# Patient Record
Sex: MALE | Race: WHITE | NOT HISPANIC OR LATINO | Employment: FULL TIME | URBAN - METROPOLITAN AREA
[De-identification: names, ages, dates, MRNs, and addresses within clinical notes are randomized per-mention and may not be internally consistent; named-entity substitution may affect disease eponyms.]

---

## 2021-07-24 ENCOUNTER — APPOINTMENT (OUTPATIENT)
Dept: GENERAL RADIOLOGY | Facility: CLINIC | Age: 36
End: 2021-07-24
Attending: EMERGENCY MEDICINE

## 2021-07-24 ENCOUNTER — HOSPITAL ENCOUNTER (EMERGENCY)
Facility: CLINIC | Age: 36
Discharge: HOME | End: 2021-07-24
Attending: EMERGENCY MEDICINE | Admitting: EMERGENCY MEDICINE

## 2021-07-24 VITALS
RESPIRATION RATE: 18 BRPM | HEART RATE: 83 BPM | OXYGEN SATURATION: 99 % | SYSTOLIC BLOOD PRESSURE: 136 MMHG | DIASTOLIC BLOOD PRESSURE: 67 MMHG | WEIGHT: 171 LBS | TEMPERATURE: 98 F

## 2021-07-24 DIAGNOSIS — S62.365A CLOSED NONDISPLACED FRACTURE OF NECK OF FOURTH METACARPAL BONE OF LEFT HAND, INITIAL ENCOUNTER: ICD-10-CM

## 2021-07-24 PROCEDURE — 99284 EMERGENCY DEPT VISIT MOD MDM: CPT | Mod: 25

## 2021-07-24 PROCEDURE — 73110 X-RAY EXAM OF WRIST: CPT | Mod: LT

## 2021-07-24 PROCEDURE — 73130 X-RAY EXAM OF HAND: CPT | Mod: LT

## 2021-07-24 PROCEDURE — 250N000013 HC RX MED GY IP 250 OP 250 PS 637: Performed by: EMERGENCY MEDICINE

## 2021-07-24 PROCEDURE — 26600 TREAT METACARPAL FRACTURE: CPT | Mod: LT

## 2021-07-24 RX ORDER — ACETAMINOPHEN 325 MG/1
975 TABLET ORAL ONCE
Status: DISCONTINUED | OUTPATIENT
Start: 2021-07-24 | End: 2021-07-24 | Stop reason: HOSPADM

## 2021-07-24 RX ORDER — OXYCODONE HYDROCHLORIDE 5 MG/1
5 TABLET ORAL EVERY 6 HOURS PRN
Qty: 12 TABLET | Refills: 0 | Status: SHIPPED | OUTPATIENT
Start: 2021-07-24

## 2021-07-24 RX ORDER — OXYCODONE HYDROCHLORIDE 5 MG/1
5 TABLET ORAL ONCE
Status: DISCONTINUED | OUTPATIENT
Start: 2021-07-24 | End: 2021-07-24 | Stop reason: HOSPADM

## 2021-07-24 RX ADMIN — ACETAMINOPHEN 975 MG: 325 TABLET, FILM COATED ORAL at 17:30

## 2021-07-24 RX ADMIN — OXYCODONE HYDROCHLORIDE 5 MG: 5 TABLET ORAL at 17:30
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
## 2021-07-24 NOTE — DISCHARGE INSTRUCTIONS
I recommend Tylenol and ibuprofen as needed for pain, you can ice your knuckle through your splint but your splint cannot get wet this includes in the shower.  You can take the oxycodone as needed for more severe pain.  You should elevate your hand to as this can help with swelling.  You should make an appointment to follow-up with orthopedic hand surgeon, you can call the number above and be seen by any of their partners there.  Should you have severe pain, the fingers on your hand turn ghost white or you have severe numbness you should return to the emergency department this can be a sign that your splint is too tight.

## 2021-07-24 NOTE — LETTER
July 24, 2021      To Whom It May Concern:      Phil Barros was seen in our Emergency Department today, 07/24/21.  It is okay for Phil to work, however he needs to wear his splint at all times and should not be put in a position that can compromise the integrity of his splint.  He is due to follow-up with orthopedic hand surgery sometime next week complete.    Sincerely,    _______________    Sebastian Meyers MD

## 2025-01-22 ENCOUNTER — HOSPITAL ENCOUNTER (EMERGENCY)
Facility: HOSPITAL | Age: 40
Discharge: HOME | End: 2025-01-22
Attending: EMERGENCY MEDICINE | Admitting: EMERGENCY MEDICINE

## 2025-01-22 ENCOUNTER — OFFICE VISIT (OUTPATIENT)
Dept: URGENT CARE | Facility: URGENT CARE | Age: 40
End: 2025-01-22

## 2025-01-22 VITALS
DIASTOLIC BLOOD PRESSURE: 99 MMHG | TEMPERATURE: 99.1 F | HEART RATE: 72 BPM | WEIGHT: 270.9 LBS | RESPIRATION RATE: 18 BRPM | OXYGEN SATURATION: 97 % | SYSTOLIC BLOOD PRESSURE: 116 MMHG

## 2025-01-22 VITALS
WEIGHT: 274.2 LBS | OXYGEN SATURATION: 99 % | HEIGHT: 75 IN | TEMPERATURE: 98.5 F | BODY MASS INDEX: 34.09 KG/M2 | RESPIRATION RATE: 25 BRPM | DIASTOLIC BLOOD PRESSURE: 77 MMHG | HEART RATE: 66 BPM | SYSTOLIC BLOOD PRESSURE: 131 MMHG

## 2025-01-22 DIAGNOSIS — R07.9 CHEST PAIN, UNSPECIFIED TYPE: Primary | ICD-10-CM

## 2025-01-22 DIAGNOSIS — R07.9 CHEST PAIN, UNSPECIFIED TYPE: ICD-10-CM

## 2025-01-22 LAB
ALBUMIN SERPL BCG-MCNC: 4.1 G/DL (ref 3.5–5.2)
ALP SERPL-CCNC: 108 U/L (ref 40–150)
ALT SERPL W P-5'-P-CCNC: 27 U/L (ref 0–70)
ANION GAP SERPL CALCULATED.3IONS-SCNC: 10 MMOL/L (ref 7–15)
AST SERPL W P-5'-P-CCNC: 27 U/L (ref 0–45)
BASOPHILS # BLD AUTO: 0.1 10E3/UL (ref 0–0.2)
BASOPHILS NFR BLD AUTO: 1 %
BILIRUB SERPL-MCNC: 0.4 MG/DL
BUN SERPL-MCNC: 6.6 MG/DL (ref 6–20)
CALCIUM SERPL-MCNC: 9.1 MG/DL (ref 8.8–10.4)
CHLORIDE SERPL-SCNC: 103 MMOL/L (ref 98–107)
CREAT SERPL-MCNC: 0.97 MG/DL (ref 0.67–1.17)
D DIMER PPP FEU-MCNC: <0.27 UG/ML FEU (ref 0–0.5)
EGFRCR SERPLBLD CKD-EPI 2021: >90 ML/MIN/1.73M2
EOSINOPHIL # BLD AUTO: 0.2 10E3/UL (ref 0–0.7)
EOSINOPHIL NFR BLD AUTO: 2 %
ERYTHROCYTE [DISTWIDTH] IN BLOOD BY AUTOMATED COUNT: 12.9 % (ref 10–15)
GLUCOSE SERPL-MCNC: 96 MG/DL (ref 70–99)
HCO3 SERPL-SCNC: 27 MMOL/L (ref 22–29)
HCT VFR BLD AUTO: 42.2 % (ref 40–53)
HGB BLD-MCNC: 14.1 G/DL (ref 13.3–17.7)
IMM GRANULOCYTES # BLD: 0 10E3/UL
IMM GRANULOCYTES NFR BLD: 0 %
LYMPHOCYTES # BLD AUTO: 2.7 10E3/UL (ref 0.8–5.3)
LYMPHOCYTES NFR BLD AUTO: 35 %
MCH RBC QN AUTO: 31 PG (ref 26.5–33)
MCHC RBC AUTO-ENTMCNC: 33.4 G/DL (ref 31.5–36.5)
MCV RBC AUTO: 93 FL (ref 78–100)
MONOCYTES # BLD AUTO: 0.6 10E3/UL (ref 0–1.3)
MONOCYTES NFR BLD AUTO: 8 %
NEUTROPHILS # BLD AUTO: 4.2 10E3/UL (ref 1.6–8.3)
NEUTROPHILS NFR BLD AUTO: 55 %
NRBC # BLD AUTO: 0 10E3/UL
NRBC BLD AUTO-RTO: 0 /100
NT-PROBNP SERPL-MCNC: 88 PG/ML (ref 0–450)
PLATELET # BLD AUTO: 242 10E3/UL (ref 150–450)
POTASSIUM SERPL-SCNC: 3.9 MMOL/L (ref 3.4–5.3)
PROT SERPL-MCNC: 7.5 G/DL (ref 6.4–8.3)
RBC # BLD AUTO: 4.55 10E6/UL (ref 4.4–5.9)
SODIUM SERPL-SCNC: 140 MMOL/L (ref 135–145)
TROPONIN T SERPL HS-MCNC: <6 NG/L
WBC # BLD AUTO: 7.6 10E3/UL (ref 4–11)

## 2025-01-22 PROCEDURE — 83880 ASSAY OF NATRIURETIC PEPTIDE: CPT | Performed by: EMERGENCY MEDICINE

## 2025-01-22 PROCEDURE — 99204 OFFICE O/P NEW MOD 45 MIN: CPT | Performed by: PHYSICIAN ASSISTANT

## 2025-01-22 PROCEDURE — 85004 AUTOMATED DIFF WBC COUNT: CPT | Performed by: EMERGENCY MEDICINE

## 2025-01-22 PROCEDURE — 84484 ASSAY OF TROPONIN QUANT: CPT | Performed by: EMERGENCY MEDICINE

## 2025-01-22 PROCEDURE — 36415 COLL VENOUS BLD VENIPUNCTURE: CPT | Performed by: EMERGENCY MEDICINE

## 2025-01-22 PROCEDURE — 93005 ELECTROCARDIOGRAM TRACING: CPT | Performed by: PHYSICIAN ASSISTANT

## 2025-01-22 PROCEDURE — 85379 FIBRIN DEGRADATION QUANT: CPT | Performed by: EMERGENCY MEDICINE

## 2025-01-22 PROCEDURE — 99285 EMERGENCY DEPT VISIT HI MDM: CPT | Mod: 25

## 2025-01-22 PROCEDURE — 93005 ELECTROCARDIOGRAM TRACING: CPT | Performed by: EMERGENCY MEDICINE

## 2025-01-22 PROCEDURE — 93005 ELECTROCARDIOGRAM TRACING: CPT | Performed by: STUDENT IN AN ORGANIZED HEALTH CARE EDUCATION/TRAINING PROGRAM

## 2025-01-22 PROCEDURE — 85048 AUTOMATED LEUKOCYTE COUNT: CPT | Performed by: EMERGENCY MEDICINE

## 2025-01-22 PROCEDURE — 82040 ASSAY OF SERUM ALBUMIN: CPT | Performed by: EMERGENCY MEDICINE

## 2025-01-22 ASSESSMENT — COLUMBIA-SUICIDE SEVERITY RATING SCALE - C-SSRS
6. HAVE YOU EVER DONE ANYTHING, STARTED TO DO ANYTHING, OR PREPARED TO DO ANYTHING TO END YOUR LIFE?: NO
1. IN THE PAST MONTH, HAVE YOU WISHED YOU WERE DEAD OR WISHED YOU COULD GO TO SLEEP AND NOT WAKE UP?: NO
2. HAVE YOU ACTUALLY HAD ANY THOUGHTS OF KILLING YOURSELF IN THE PAST MONTH?: NO

## 2025-01-22 ASSESSMENT — ACTIVITIES OF DAILY LIVING (ADL)
ADLS_ACUITY_SCORE: 41
ADLS_ACUITY_SCORE: 41

## 2025-01-23 LAB
ATRIAL RATE - MUSE: 69 BPM
ATRIAL RATE - MUSE: 72 BPM
DIASTOLIC BLOOD PRESSURE - MUSE: NORMAL MMHG
DIASTOLIC BLOOD PRESSURE - MUSE: NORMAL MMHG
INTERPRETATION ECG - MUSE: NORMAL
INTERPRETATION ECG - MUSE: NORMAL
P AXIS - MUSE: 62 DEGREES
P AXIS - MUSE: 69 DEGREES
PR INTERVAL - MUSE: 124 MS
PR INTERVAL - MUSE: 138 MS
QRS DURATION - MUSE: 100 MS
QRS DURATION - MUSE: 102 MS
QT - MUSE: 384 MS
QT - MUSE: 394 MS
QTC - MUSE: 420 MS
QTC - MUSE: 422 MS
R AXIS - MUSE: 63 DEGREES
R AXIS - MUSE: 78 DEGREES
SYSTOLIC BLOOD PRESSURE - MUSE: NORMAL MMHG
SYSTOLIC BLOOD PRESSURE - MUSE: NORMAL MMHG
T AXIS - MUSE: 56 DEGREES
T AXIS - MUSE: 57 DEGREES
VENTRICULAR RATE- MUSE: 69 BPM
VENTRICULAR RATE- MUSE: 72 BPM

## 2025-01-23 NOTE — DISCHARGE INSTRUCTIONS
Again your testing was reassuring here today.  No signs of heart attack, blood clots on the lungs, or infection  Most likely musculoskeletal cause  Ice or heat to area of discomfort, I would recommend ibuprofen 600 mg every 6 hours  Follow-up closely with primary care if ongoing symptoms.  Referral placed today  Return if any acute worsening of symptoms

## 2025-01-23 NOTE — ED PROVIDER NOTES
EMERGENCY DEPARTMENT ENCOUNTER      NAME: Phil Barros  AGE: 39 year old male  YOB: 1985  MRN: [id number]  EVALUATION DATE & TIME: No admission date for patient encounter.    PCP: System, Provider Not In    ED PROVIDER: Bridget Burt DO      Chief Complaint   Patient presents with    Chest Pain         FINAL IMPRESSION:  1. Chest pain, unspecified type          ED COURSE & MEDICAL DECISION MAKING:    Pertinent Labs & Imaging studies reviewed. (See chart for details)  9:26 PM I met the patient and performed my initial interview and exam.    39 year old male presents to the Emergency Department for evaluation of chest pain.    MEDICAL DECISION MAKING: I was concerned about this patient's chest pain and considered multiple causes including but not limited to the following.     ACS: EKG does not show acute ischemic changes and cardiac enzymes are negative. Heart Score is low risk.  PE:  Wells Criteria is low risk.  D-dimer negative  Aortic Dissection: The onset of pain was neither sudden nor severe, no history of poorly controlled high blood pressure and radial / pedal pulses are symmetrical, There is no widening of the mediastinum on chest x-ray, and no marfanoid features are present.  Cardiac Tamponade:  EKG shows no decreased voltage, heart sounds are not diminished on exam.    PTX: No sign of pneumothorax on chest x-ray.   Esophageal Rupture: There was not preceding forceful vomiting or retching and the CXR does not show mediastinal free air.   Other causes considered:  pericarditis, pleural effusion, pneumonia and referred pain from the ABD      IMPRESSION: Based on this patient's history, exam, and diagnostic results, the working diagnosis of this patient's chest pain is likely musculoskeletal.       DISPOSITION PLAN:    Discharge. Patient is appropriate for outpatient management with medications per discharge instructions.  Patient was given verbal and written discharge instructions for follow  up as well as indications for return to the Emergency Department.     At the conclusion of the encounter I discussed the results of all of the tests and the disposition. The questions were answered. The patient or family acknowledged understanding and was agreeable with the care plan.     Medical Decision Making  Obtained supplemental history:Supplemental history obtained?: No  Reviewed external records: External records reviewed?: Documented in chart and Outpatient Record: Visit on 8/6/2018 at [facility] Emergency Department for evaluation of leg pain/problem.  Care impacted by chronic illness:Documented in Chart  Did you consider but not order tests?: Work up considered but not performed and documented in chart, if applicable  Did you interpret images independently?: Independent interpretation of ECG and images noted in documentation, when applicable.  Consultation discussion with other provider:Did you involve another provider (consultant, , pharmacy, etc.)?: No  Discharge. No recommendations on prescription strength medication(s). See documentation for any additional details.    MIPS: Not Applicable        MEDICATIONS GIVEN IN THE EMERGENCY:  Medications - No data to display    NEW PRESCRIPTIONS STARTED AT TODAY'S ER VISIT  There are no discharge medications for this patient.         =================================================================    HPI    Patient information was obtained from: Patient    Use of : N/A         Phil Barros is a 39 year old male with a pertinent history of DVT who presents to this ED for evaluation of chest pain.  Symptoms around 4 days ago.  Thought it was related to eating pizza the night before.  Pain with movements.  Unable to get comfortable while laying flat.  No shortness of breath.  No cough or fever.  No leg swelling.  He has not tried anything for symptoms.  Reports in 2018 he was diagnosed with a DVT in his left leg.  He took about a couple  "months of anticoagulation but then stopped and never followed up.  Reports dad with a history of blood clots but unsure the cause.  He went to urgent care and given this history was recommended to seek evaluation in the emergency department.    Patient seen at outside hospital for leg pain on 8/6/2018.  Deep compartment veins within the gastrocnemius muscles of the calf are noncompressible and likely thrombosed.  Had been prescribed Xarelto.      REVIEW OF SYSTEMS   Per HPI    PAST MEDICAL HISTORY:  No past medical history on file.    PAST SURGICAL HISTORY:  No past surgical history on file.        CURRENT MEDICATIONS:    No current outpatient medications on file.       ALLERGIES:  No Known Allergies    FAMILY HISTORY:  No family history on file.    SOCIAL HISTORY:   Social History     Socioeconomic History    Marital status:        VITALS:  /77   Pulse 66   Temp 98.5  F (36.9  C) (Oral)   Resp 25   Ht 1.905 m (6' 3\")   Wt 124.4 kg (274 lb 3.2 oz)   SpO2 99%   BMI 34.27 kg/m      PHYSICAL EXAM    Physical Exam  Constitutional:       General: He is not in acute distress.  HENT:      Head: Normocephalic and atraumatic.      Mouth/Throat:      Pharynx: Oropharynx is clear.   Eyes:      Pupils: Pupils are equal, round, and reactive to light.   Cardiovascular:      Rate and Rhythm: Normal rate and regular rhythm.      Pulses: Normal pulses.      Heart sounds: Normal heart sounds.   Pulmonary:      Effort: Pulmonary effort is normal.      Breath sounds: Normal breath sounds.   Chest:      Chest wall: Tenderness present. No deformity or crepitus.   Abdominal:      General: Abdomen is flat. Bowel sounds are normal.      Palpations: Abdomen is soft.      Tenderness: There is no abdominal tenderness.   Musculoskeletal:         General: Normal range of motion.   Skin:     General: Skin is warm and dry.      Capillary Refill: Capillary refill takes less than 2 seconds.   Neurological:      General: No focal " deficit present.      Mental Status: He is alert and oriented to person, place, and time.             LAB:  All pertinent labs reviewed and interpreted.  Labs Ordered and Resulted from Time of ED Arrival to Time of ED Departure   COMPREHENSIVE METABOLIC PANEL - Normal       Result Value    Sodium 140      Potassium 3.9      Carbon Dioxide (CO2) 27      Anion Gap 10      Urea Nitrogen 6.6      Creatinine 0.97      GFR Estimate >90      Calcium 9.1      Chloride 103      Glucose 96      Alkaline Phosphatase 108      AST 27      ALT 27      Protein Total 7.5      Albumin 4.1      Bilirubin Total 0.4     TROPONIN T, HIGH SENSITIVITY - Normal    Troponin T, High Sensitivity <6     NT PROBNP INPATIENT - Normal    N terminal Pro BNP Inpatient 88     D DIMER QUANTITATIVE - Normal    D-Dimer Quantitative <0.27     CBC WITH PLATELETS AND DIFFERENTIAL    WBC Count 7.6      RBC Count 4.55      Hemoglobin 14.1      Hematocrit 42.2      MCV 93      MCH 31.0      MCHC 33.4      RDW 12.9      Platelet Count 242      % Neutrophils 55      % Lymphocytes 35      % Monocytes 8      % Eosinophils 2      % Basophils 1      % Immature Granulocytes 0      NRBCs per 100 WBC 0      Absolute Neutrophils 4.2      Absolute Lymphocytes 2.7      Absolute Monocytes 0.6      Absolute Eosinophils 0.2      Absolute Basophils 0.1      Absolute Immature Granulocytes 0.0      Absolute NRBCs 0.0         RADIOLOGY:  Reviewed all pertinent imaging. Please see official radiology report.  Chest XR,  PA & LAT   Final Result   IMPRESSION: Normal chest radiograph.          EKG:    Performed at: 20:13:44    Impression: Sinus rhythm.     Rate: 72 bpm  Rhythm: Sinus  Axis: 63  WA Interval: 138 ms  QRS Interval: 100 ms  QTc Interval: 420 ms  ST Changes: N/A  Comparison: No previous EKG(s) available.     I have independently reviewed and interpreted the EKG(s) documented above.      Bridget Burt,   Emergency Medicine  [facility]  EMERGENCY DEPARTMENT  [address] [place] [id number]  [phone]  Dept: [phone]       Carmel, Bridget RICH DO  01/22/25 2316

## 2025-01-23 NOTE — ED NOTES
Expected Patient Referral to ED  7:43 PM    Referring Clinic/Provider:  Urgent care    Reason for referral/Clinical facts:  Left sided chest pain, prior history of PE    Recommendations provided:  Send to ED for further evaluation    Caller was informed that this institution does possess the capabilities and/or resources to provide for patient and should be transferred to our facility.    Discussed that if direct admit is sought and any hurdles are encountered, this ED would be happy to see the patient and evaluate.    Informed caller that recommendations provided are recommendations based only on the facts provided and that they responsible to accept or reject the advice, or to seek a formal in person consultation as needed and that this ED will see/treat patient should they arrive.      Bridget Burt DO  [facility] EMERGENCY DEPARTMENT  [address] [place] [id number] [phone]       Bridget Burt DO  01/22/25 1944

## 2025-01-23 NOTE — ED TRIAGE NOTES
Patient referred here from [facility]. Complains of 3 days of left sided chest pain. Sharp pain, does not radiate. Pain occurs with movement. Denies shortness of breathe.   History of DVT in 2017.

## 2025-01-23 NOTE — PROGRESS NOTES
"Assessment & Plan     Chest pain  Pt was seen for L anterior chest pain x 4 days, worse with supine position, better upright, non-exertional, some increase with deep breathing.  No associated SOB. Ddx includes but not limited to : costochondritis, pleurisy, myocarditis, pericarditis, ACS, PE.   Clinically there is some reproducibility suggesting musculoskeletal cause and had recent viral uri, however given acute onset, hx of PE would recommend patient be seen in the ED to rule out life threatening causes as above.  Pt agreeable with plan.  Discussed with EDMD at [facility].    - EKG 12-lead, tracing only             Azra Castillo PA-C  [facility] EVA Villarreal is a 39 year old male who presents to clinic today for the following health issues:  Chief Complaint   Patient presents with    Chest Pain     Sharp pain on the L side of chest- - when laying pressure on the heart , sharp pain for the past 4-5 days- no cough or cold       HPI  Pt is a 39 year old male, with hx of PE in 2018, presents to the clinic with concern re: L chest pain.    L side pain. Started 3-4 days ago. Woke with pain, no trauma to the chest or activity that could have prompted.    Light \"head cold\" about 3 weeks ago. Resolved without problem.  No sob, wheezing during that time.  Did not do covid 19 test.    Constant pain: woke with it 4 days ago. somewhat worse with deep breathing.  Increased with cough.  Laying flat increased pain.  Can keep up at night.    Slept upright last night because it hurt too bad.    No sob at rest.    No current cough.    No medication tried.    No trauma.    Had a DVT in 2018 treated with xarelto.     Unprovoked other than slept sitting up.  Sed xaralto for 2 months and has not had any recurrence.    Family history of blood clot in father: not certain of specifics.        Review of Systems  Constitutional, HEENT, cardiovascular, pulmonary, gi and gu systems are negative, " except as otherwise noted.      There is no problem list on file for this patient.    No current outpatient medications on file.     No current facility-administered medications for this visit.         Objective    BP (!) 116/99 (BP Location: Left arm, Patient Position: Sitting, Cuff Size: Adult Large)   Pulse 72   Temp 99.1  F (37.3  C) (Oral)   Resp 18   Wt 122.9 kg (270 lb 14.4 oz)   SpO2 97%   Physical Exam   Pt is in no acute distress and appears well  Ears patent B:  TM s intact, non-injected. All land marks easily visibile    Nasal mucosa is non-edematous, no discharge.    Pharynx: non erythematous, tonsils non hypertrophied, No exudate   Neck supple: no adenopathy  Lungs: CTA  Heart: RRR, no murmur, no thrills or heaves   Ext: no edema  Skin: no rashes    ECG per my indpendent evaluation, NSR, no ST elevation or depression.    No ectopy.

## 2025-01-23 NOTE — ED NOTES
Patient discharged at 2309 to home. Patient provided with all discharge paperwork and educational material. All questions answered to patient's satisfaction.